# Patient Record
Sex: MALE | Race: WHITE | Employment: OTHER | URBAN - METROPOLITAN AREA
[De-identification: names, ages, dates, MRNs, and addresses within clinical notes are randomized per-mention and may not be internally consistent; named-entity substitution may affect disease eponyms.]

---

## 2017-11-07 LAB — EJECTION FRACTION: 75 %

## 2018-01-05 LAB — EJECTION FRACTION: 68 %

## 2018-04-11 ENCOUNTER — TRANSFERRED RECORDS (OUTPATIENT)
Dept: HEALTH INFORMATION MANAGEMENT | Facility: CLINIC | Age: 73
End: 2018-04-11

## 2018-09-12 ENCOUNTER — TRANSFERRED RECORDS (OUTPATIENT)
Dept: HEALTH INFORMATION MANAGEMENT | Facility: CLINIC | Age: 73
End: 2018-09-12

## 2018-10-11 ENCOUNTER — HOSPITAL ENCOUNTER (OUTPATIENT)
Dept: MRI IMAGING | Facility: CLINIC | Age: 73
Discharge: HOME | End: 2018-10-11
Admitting: SURGERY
Payer: COMMERCIAL

## 2018-10-11 DIAGNOSIS — M79.632 PAIN IN LEFT FOREARM: ICD-10-CM

## 2018-10-11 PROCEDURE — 73218 MRI UPPER EXTREMITY W/O DYE: CPT | Mod: LT

## 2019-02-19 ENCOUNTER — TRANSFERRED RECORDS (OUTPATIENT)
Dept: HEALTH INFORMATION MANAGEMENT | Facility: CLINIC | Age: 74
End: 2019-02-19

## 2019-06-21 ENCOUNTER — APPOINTMENT (OUTPATIENT)
Dept: MRI IMAGING | Facility: CLINIC | Age: 74
End: 2019-06-21
Attending: EMERGENCY MEDICINE
Payer: COMMERCIAL

## 2019-06-21 ENCOUNTER — HOSPITAL ENCOUNTER (EMERGENCY)
Facility: CLINIC | Age: 74
Discharge: HOME | End: 2019-06-21
Attending: EMERGENCY MEDICINE | Admitting: EMERGENCY MEDICINE
Payer: COMMERCIAL

## 2019-06-21 VITALS
HEART RATE: 69 BPM | RESPIRATION RATE: 20 BRPM | WEIGHT: 296.4 LBS | DIASTOLIC BLOOD PRESSURE: 88 MMHG | OXYGEN SATURATION: 96 % | TEMPERATURE: 97.8 F | SYSTOLIC BLOOD PRESSURE: 141 MMHG

## 2019-06-21 DIAGNOSIS — M54.41 ACUTE MIDLINE LOW BACK PAIN WITH RIGHT-SIDED SCIATICA: ICD-10-CM

## 2019-06-21 PROCEDURE — 99284 EMERGENCY DEPT VISIT MOD MDM: CPT | Mod: 25 | Performed by: EMERGENCY MEDICINE

## 2019-06-21 PROCEDURE — 72148 MRI LUMBAR SPINE W/O DYE: CPT

## 2019-06-21 PROCEDURE — 99285 EMERGENCY DEPT VISIT HI MDM: CPT | Mod: Z6 | Performed by: EMERGENCY MEDICINE

## 2019-06-21 RX ORDER — HYDROCODONE BITARTRATE AND ACETAMINOPHEN 5; 325 MG/1; MG/1
1 TABLET ORAL EVERY 6 HOURS PRN
Qty: 18 TABLET | Refills: 0 | Status: SHIPPED | OUTPATIENT
Start: 2019-06-21 | End: 2019-06-24

## 2019-06-21 RX ORDER — METHYLPREDNISOLONE 4 MG
TABLET, DOSE PACK ORAL
Qty: 21 TABLET | Refills: 0 | Status: SHIPPED | OUTPATIENT
Start: 2019-06-21

## 2019-06-21 NOTE — ED TRIAGE NOTES
Pt comes in with complaints of lumbar back pain. [facility] and told to come back for an MRI if it gets worse. Pt states he has bilateral leg numbness R>L.

## 2019-06-21 NOTE — ED PROVIDER NOTES
"  History     Chief Complaint   Patient presents with     Back Pain     HPI     Bebeto Hoffman is a 73 year old male who presents to the ED complaining of low back pain that started a week ago. Patient feels that he twisted it while working on his zero turn , symptoms were bearable but then he did a lot of driving and sitting which is making the pain constant. He describes the pain as located in the \"small of his back, in the middle\" and it is sharp and worsens with movement, he also states that it hurts to go down stairs due to the pain and weakness. Patient stated he is also having numbness and tingling down his right leg.  He does have a history of RA which he is followed by Rheumatology for treatment. Denies dysuria,constipation or fever.    [facility] stated [facility] ( 2 days ago)  for this pain and xrays were obtained, the [facility] recommended an [facility] be completed but he does not have that scheduled yet.     Patient is on Humira for his rheumatoid arthritis.  He has been on prednisone a number of times in the past.  He does not remember having any falls.  No history of low back issues, injections, surgery.  No loss of bowel or bladder function.  No saddle anesthesia.  He is able to ambulate.  No other complaints.      Allergies:  No Known Allergies    Problem List:    There are no active problems to display for this patient.       Past Medical History:    History reviewed. No pertinent past medical history.    Past Surgical History:    History reviewed. No pertinent surgical history.    Family History:    History reviewed. No pertinent family history.    Social History:  Marital Status:   [2]  Social History     Tobacco Use     Smoking status: Former Smoker     Smokeless tobacco: Never Used   Substance Use Topics     Alcohol use: Not Currently     Drug use: Not Currently        Medications:     Humira    Review of Systems     All other ROS reviewed and are " negative or non-contributory except as stated in HPI.      Physical Exam   BP: 156/79  Heart Rate: 82  Temp: 98.6  F (37  C)  Resp: 16  Weight: 134.4 kg (296 lb 6.4 oz)  SpO2: 96 %      Physical Exam   Constitutional: He appears well-developed and well-nourished.   Pleasant, moderately obese male lying in the bed.   HENT:   Head: Normocephalic.   Nose: Nose normal.   Mouth/Throat: Oropharynx is clear and moist.   Eyes: Conjunctivae and EOM are normal.   Neck: Normal range of motion. Neck supple.   Cardiovascular: Normal rate, regular rhythm, normal heart sounds and intact distal pulses.   Pulmonary/Chest: Effort normal and breath sounds normal.   Abdominal: Soft. There is no tenderness.   Musculoskeletal:        Back:    Some slight bony changes of bilateral hands associated with rheumatoid arthritis.  Bilateral knee scars from TKA   Neurological: He is alert. He exhibits normal muscle tone. Coordination normal.   Skin: Skin is warm and dry. He is not diaphoretic.   Psychiatric: He has a normal mood and affect. His behavior is normal.   Vitals reviewed.      ED Course (with Medical Decision Making)    Pt seen and examined by me.  RN and EPIC notes reviewed.      Patient with low back pain, nontraumatic.  History of rheumatoid arthritis.  [facility] in [facility] did not show acute fracture or other bony abnormality.  I did discuss options with the patient including medicating him and have [facility].  I also spoke with MRI who does have an opening tonight.  We are going to try to get this done tonight so he can have closer follow-up.  He drove himself here, so he was not given any medications in the ED.  He declines Toradol.    My plan will be to send him home with a steroid taper, some oral pain medications.  Follow-up with primary care provider/neurosurgery depending on MRI results.    Patient was signed out to Dr. Harper at 7 PM pending MRI.        Procedures    No  results found for this or any previous visit (from the past 24 hour(s)).    Medications - No data to display    Assessments & Plan        Medication List      Started    HYDROcodone-acetaminophen 5-325 MG tablet  Commonly known as:  NORCO  1 tablet, Oral, EVERY 6 HOURS PRN     methylPREDNISolone 4 MG tablet therapy pack  Commonly known as:  MEDROL DOSEPAK  Follow package instructions            Final diagnoses:   Acute midline low back pain with right-sided sciatica         This document serves as a record of services personally performed by  Cecelia Eason MD.  It was created on their behalf by Tiara Beal, a trained medical scribe. The creation of this record is based on the provider's personal observations and the statements of the patient. This document has been checked and approved by the attending provider.    Disclaimer : This note consists of symbols derived from keyboarding, dictation and/or voice recognition software. As a result, there may be errors in the script that have gone undetected. Please consider this when interpreting information found in this chart.      6/21/2019   [facility] EMERGENCY DEPARTMENT     Cecelia Eason MD  06/21/19 1943

## 2019-06-21 NOTE — ED AVS SNAPSHOT
[facility] Emergency Department  [address] [place] [address]  Phone:  [phone]  Fax:  [phone]                                    Bebeto Hoffman   MRN: [id number]    Department:  [facility] Emergency Department   Date of Visit:  6/21/2019           After Visit Summary Signature Page    I have received my discharge instructions, and my questions have been answered. I have discussed any challenges I see with this plan with the nurse or doctor.    ..........................................................................................................................................  Patient/Patient Representative Signature      ..........................................................................................................................................  Patient Representative Print Name and Relationship to Patient    ..................................................               ................................................  Date                                   Time    ..........................................................................................................................................  Reviewed by Signature/Title    ...................................................              ..............................................  Date                                               Time          22EPIC Rev 08/18

## 2019-06-22 NOTE — DISCHARGE INSTRUCTIONS
Rest, ice or heat to the painful area.    Okay to use ibuprofen or Aleve for pain and inflammation.    Vicodin for severe pain.  This may cause constipation and drowsiness.  No driving while on this medication.  Take stool softeners if needed.    Medrol Dosepak as prescribed.  This is a steroid to decrease inflammation.    Follow-up with primary care provider for possible referral for physical therapy or spine specialist.    Return for significant worsening, changes or concerns.    I hope that this starts to improve very quickly!!

## 2019-06-22 NOTE — ED PROVIDER NOTES
Patient was signed out to me at change of shift by Dr. Eason.  I was asked to follow-up on his MRI results.    Results for orders placed or performed during the hospital encounter of 06/21/19 (from the past 24 hour(s))   Lumbar spine MRI w/o contrast    Narrative    MRI LUMBAR SPINE WITHOUT CONTRAST   6/21/2019 7:31 PM     HISTORY: Back pain, less than 6 weeks, no red flags, no prior  management; acute low back pain; history of rheumatoid arthritis on  Humira and steroids in the past.    TECHNIQUE: Multiplanar multisequence MRI of the lumbar spine without  contrast.    COMPARISON: None.     FINDINGS:  Alignment is significant for grade 1 anterolisthesis of L4  on L5. Multilevel mild disc height loss is present. Moderate facet  arthropathy is present at L1-2, L2-3, and L3-4 and severe facet  arthropathy at L4-5 and L5-S1. The conus medullaris demonstrates  slight clumping and irregularity suggestive of sequela of prior  arachnoiditis. Paraspinal soft tissues are unremarkable.    Segmental Analysis:     T12-L1:  No significant spinal canal or foraminal stenosis.     L1-L2:  Broad-based disc bulge. Mild spinal canal stenosis. Moderate  right foraminal stenosis.    L2-L3:  Broad-based disc bulge. Moderate bilateral facet arthropathy.  Mild spinal canal stenosis. No significant foraminal stenosis.      L3-L4:  Broad-based disc bulge and moderate bilateral facet  arthropathy. Moderate-to-severe spinal canal stenosis. Mild bilateral  foraminal stenosis.      L4-L5:  Broad-based disc bulge. Severe bilateral facet arthropathy.  Severe spinal canal stenosis. Moderate left foraminal stenosis. Mild  right foraminal stenosis.      L5-S1:  Broad-based disc bulge. Severe bilateral facet arthropathy.  Moderate-to-severe left foraminal stenosis. Moderate right foraminal  stenosis.      Impression    IMPRESSION:    1. Moderate-to-severe degenerative change as detailed, worst at L4-5.  2. Slight clumping and irregularity of the  cauda equina suggestive of  sequela of arachnoiditis.    GERONIMO STEWART MD        8:06 PM:  Call placed to Dr Grace from Neurosurgery to discuss MRI results/recommendations.      8:10 PM:  Dr Grace called back and we reviewed his case and MRI results.  He agrees with Dr. Eason's plan for pain medication and a Medrol Dosepak.  He does not feel there is anything that needs to be done acutely tonight.  He can follow-up with his primary physician or spine specialist next week if he has persistent problems.  I reviewed the MRI results with him along with Dr. Richard's recommendations.  I sent his written MRI report with him and we placed his MRI on a CD so [facility].  He is confident that he can make it at home.         David Mendoza MD  06/21/19 2025

## 2019-09-17 ENCOUNTER — TRANSFERRED RECORDS (OUTPATIENT)
Dept: HEALTH INFORMATION MANAGEMENT | Facility: CLINIC | Age: 74
End: 2019-09-17

## 2019-10-17 ENCOUNTER — TRANSFERRED RECORDS (OUTPATIENT)
Dept: HEALTH INFORMATION MANAGEMENT | Facility: CLINIC | Age: 74
End: 2019-10-17

## 2019-10-21 LAB — EJECTION FRACTION: NORMAL %

## 2019-12-02 ENCOUNTER — HOSPITAL ENCOUNTER (OUTPATIENT)
Dept: CARDIOLOGY | Facility: CLINIC | Age: 74
Discharge: HOME | End: 2019-12-02
Attending: INTERNAL MEDICINE | Admitting: INTERNAL MEDICINE
Payer: COMMERCIAL

## 2019-12-02 ENCOUNTER — OFFICE VISIT (OUTPATIENT)
Dept: CARDIOLOGY | Facility: CLINIC | Age: 74
End: 2019-12-02
Payer: COMMERCIAL

## 2019-12-02 VITALS
HEIGHT: 72 IN | OXYGEN SATURATION: 96 % | HEART RATE: 72 BPM | BODY MASS INDEX: 42.31 KG/M2 | WEIGHT: 312.4 LBS | RESPIRATION RATE: 12 BRPM | DIASTOLIC BLOOD PRESSURE: 64 MMHG | SYSTOLIC BLOOD PRESSURE: 116 MMHG

## 2019-12-02 DIAGNOSIS — I49.9 CARDIAC ARRHYTHMIA, UNSPECIFIED CARDIAC ARRHYTHMIA TYPE: ICD-10-CM

## 2019-12-02 DIAGNOSIS — I74.9 EMBOLISM AND THROMBOSIS OF ARTERY (H): Primary | ICD-10-CM

## 2019-12-02 DIAGNOSIS — I49.9 CARDIAC ARRHYTHMIA, UNSPECIFIED CARDIAC ARRHYTHMIA TYPE: Primary | ICD-10-CM

## 2019-12-02 PROCEDURE — 93005 ELECTROCARDIOGRAM TRACING: CPT | Performed by: REHABILITATION PRACTITIONER

## 2019-12-02 PROCEDURE — 99203 OFFICE O/P NEW LOW 30 MIN: CPT | Mod: 25 | Performed by: INTERNAL MEDICINE

## 2019-12-02 PROCEDURE — 93000 ELECTROCARDIOGRAM COMPLETE: CPT | Performed by: INTERNAL MEDICINE

## 2019-12-02 RX ORDER — ALBUTEROL SULFATE 90 UG/1
2 AEROSOL, METERED RESPIRATORY (INHALATION) EVERY 6 HOURS
COMMUNITY

## 2019-12-02 RX ORDER — FEBUXOSTAT 40 MG/1
40 TABLET, FILM COATED ORAL DAILY
COMMUNITY

## 2019-12-02 RX ORDER — FERROUS SULFATE 324(65)MG
TABLET, DELAYED RELEASE (ENTERIC COATED) ORAL
COMMUNITY

## 2019-12-02 RX ORDER — FUROSEMIDE 20 MG
20 TABLET ORAL DAILY
COMMUNITY

## 2019-12-02 RX ORDER — TAMSULOSIN HYDROCHLORIDE 0.4 MG/1
0.4 CAPSULE ORAL DAILY
COMMUNITY

## 2019-12-02 RX ORDER — WARFARIN SODIUM 5 MG/1
5 TABLET ORAL DAILY
COMMUNITY

## 2019-12-02 RX ORDER — HYDROCHLOROTHIAZIDE 25 MG/1
25 TABLET ORAL DAILY
COMMUNITY

## 2019-12-02 RX ORDER — HYDRALAZINE HYDROCHLORIDE 50 MG/1
50 TABLET, FILM COATED ORAL 2 TIMES DAILY
COMMUNITY

## 2019-12-02 RX ORDER — CETIRIZINE HYDROCHLORIDE 10 MG/1
10 TABLET ORAL DAILY
COMMUNITY

## 2019-12-02 RX ORDER — ISOSORBIDE MONONITRATE 60 MG/1
60 TABLET, EXTENDED RELEASE ORAL DAILY
COMMUNITY

## 2019-12-02 RX ORDER — PREDNISOLONE 5 MG/1
5 TABLET ORAL DAILY
COMMUNITY

## 2019-12-02 RX ORDER — BUDESONIDE AND FORMOTEROL FUMARATE DIHYDRATE 160; 4.5 UG/1; UG/1
2 AEROSOL RESPIRATORY (INHALATION) 2 TIMES DAILY
COMMUNITY

## 2019-12-02 RX ORDER — ACETAMINOPHEN 500 MG
500-1000 TABLET ORAL 2 TIMES DAILY
COMMUNITY

## 2019-12-02 RX ORDER — LOSARTAN POTASSIUM 100 MG/1
100 TABLET ORAL DAILY
COMMUNITY

## 2019-12-02 RX ORDER — BUPROPION HYDROCHLORIDE 150 MG/1
150 TABLET, EXTENDED RELEASE ORAL DAILY
COMMUNITY

## 2019-12-02 RX ORDER — CHLORAL HYDRATE 500 MG
2 CAPSULE ORAL DAILY
COMMUNITY

## 2019-12-02 RX ORDER — METOPROLOL TARTRATE 100 MG
100 TABLET ORAL DAILY
COMMUNITY

## 2019-12-02 RX ORDER — AMLODIPINE BESYLATE AND ATORVASTATIN CALCIUM 10; 10 MG/1; MG/1
1 TABLET, FILM COATED ORAL DAILY
COMMUNITY

## 2019-12-02 RX ORDER — POTASSIUM CHLORIDE 1500 MG/1
20 TABLET, EXTENDED RELEASE ORAL DAILY
COMMUNITY

## 2019-12-02 RX ORDER — LEVOTHYROXINE SODIUM 50 UG/1
50 TABLET ORAL DAILY
COMMUNITY

## 2019-12-02 RX ORDER — LEFLUNOMIDE 20 MG/1
20 TABLET ORAL DAILY
COMMUNITY

## 2019-12-02 ASSESSMENT — MIFFLIN-ST. JEOR: SCORE: 2205.04

## 2019-12-02 ASSESSMENT — PAIN SCALES - GENERAL: PAINLEVEL: NO PAIN (0)

## 2019-12-02 NOTE — PROGRESS NOTES
HISTORY:    Bebeto Hoffman is a 74-year-old gentleman generally cared for at the [facility].  He is accompanied by his wife today.  He was asked to be seen by cardiology for a possible EDWIN.    Unfortunately, Mango presents with essentially no records.  He reports that he developed a thrombosis of his left arm recently and required surgical thrombectomy.  Apparently the surgeon felt that this was likely from the heart.  An echocardiogram was done during the index hospitalization but that is not available for me to review and needed the patient nor his wife are aware of the results.    When I asked Mango what kind of medical problems he has had he stated that the only thing he has had in the past is arthritis.  Review of his medications and further questions shows that he also has COPD, hypertension, hyper lipidemia, previous stenting, and he believes he may have had previous MI's.  He is not sure about the latter.  He also apparently has obstructive sleep apnea.  Unfortunately, no records were sent along with him today.    I did a thorough cardiovascular review of systems with Mango today.  He denies any history of CVA or strokelike symptoms.  He has not had problems with PND/orthopnea, syncope or near syncope, any sense of palpitations, any exertional chest, arm, neck, or jaw discomfort, peripheral edema, or claudication.  He has never been told he has had atrial fibrillation.    The patient reports that his initial presenting symptoms were numbness of his left arm associated with coldness and blue discoloration.  He was brought to the hospital and surgery was done shortly thereafter.  He reports that he had some swelling of his legs prior to his arm surgery (left greater than right) but none since.  This generally has not been a major issue for him.  He does recall that he had a ultrasound and he was told he did not have any clots in his legs.      ASSESSMENT/PLAN:    1.  Left brachial artery thrombosis.   It is extremely unlikely that this is of cardiac origin.  There is no documentation of or symptoms of atrial fibrillation.  The ECG done today is essentially normal and shows no evidence of previous infarct, so LV thrombus is extremely unlikely.  Of course he may have a PFO or even an ASD but this should be detectable by echocardiography and it would be unlikely that a thrombus crossing his atrial septum would lodge just in his brachial artery and nowhere else.  The EDWIN was requested but I need to have further records before I can approve this because I am not sure it is an appropriate study.  [facility] and review them and decide whether EDWIN is appropriate and let the patient know.  I am much more suspicious that he had a primary arterial thrombosis and for that reason he should have a hematologic work-up for clotting disorders.  Appropriately, he is on warfarin.  2.  Hypertension.  Well-controlled on current med occasions, continue same.    Addendum dated 12/4/2019:  [facility] are reviewed.  His medical problems are listed as rheumatoid arthritis, coronary artery disease, CHF, hyperlipidemia, hypertension, hypothyroidism, sleep apnea, depression, PTSD.  During his hospitalization he was found to occlusion of his left brachial artery requiring operative intervention.  He was started on Lovenox followed by Coumadin.  Transthoracic echo was negative for PFO or thrombus but hematology requested a EDWIN.  In the [facility] the [facility] that I reviewed the actual report was not included but there were references to the echo.  No significant abnormalities apparently were recorded.  I think a EDWIN would be reasonable although the expected yield is extremely low.  I will put in an order for EDWIN.  Originally, this is not done in [place] and will need to be done in the [facility].  FLY Solomon MD, FACC      No orders of the defined types were placed  in this encounter.    Orders Placed This Encounter   Medications     acetaminophen (TYLENOL) 500 MG tablet     Sig: Take 500-1,000 mg by mouth 2 times daily     adalimumab (HUMIRA) 40 MG/0.8ML prefilled syringe kit     Sig: Inject 40 mg Subcutaneous every 14 days     albuterol (PROAIR HFA/PROVENTIL HFA/VENTOLIN HFA) 108 (90 Base) MCG/ACT inhaler     Sig: Inhale 2 puffs into the lungs every 6 hours     Pharmacy may dispense brand covered by insurance (Proair, or proventil or ventolin or generic albuterol inhaler)     amLODIPine-atorvastatin (CADUET) 10-10 MG tablet     Sig: Take 1 tablet by mouth daily     budesonide-formoterol (SYMBICORT) 160-4.5 MCG/ACT Inhaler     Sig: Inhale 2 puffs into the lungs 2 times daily     buPROPion (WELLBUTRIN SR) 150 MG 12 hr tablet     Sig: Take 150 mg by mouth daily     cetirizine (ZYRTEC) 10 MG tablet     Sig: Take 10 mg by mouth daily     cholecalciferol 25 MCG (1000 UT) TABS     febuxostat (ULORIC) 40 MG TABS tablet     Sig: Take 40 mg by mouth daily     Ferrous Sulfate 324 (65 Fe) MG TBEC     fish oil-omega-3 fatty acids 1000 MG capsule     Sig: Take 2 g by mouth daily     furosemide (LASIX) 20 MG tablet     Sig: Take 20 mg by mouth daily     hydrALAZINE (APRESOLINE) 50 MG tablet     Sig: Take 50 mg by mouth 2 times daily     hydrochlorothiazide (HYDRODIURIL) 25 MG tablet     Sig: Take 25 mg by mouth daily     isosorbide mononitrate (IMDUR) 60 MG 24 hr tablet     Sig: Take 60 mg by mouth daily     leflunomide (ARAVA) 20 MG tablet     Sig: Take 20 mg by mouth daily     levothyroxine (SYNTHROID/LEVOTHROID) 50 MCG tablet     Sig: Take 50 mcg by mouth daily     losartan (COZAAR) 100 MG tablet     Sig: Take 100 mg by mouth daily     metoprolol tartrate (LOPRESSOR) 100 MG tablet     Sig: Take 100 mg by mouth daily     omeprazole (PRILOSEC) 20 MG DR capsule     Sig: Take 20 mg by mouth daily     potassium chloride ER (K-DUR/KLOR-CON M) 20 MEQ CR tablet     Sig: Take 20 mEq by mouth  daily     prednisoLONE 5 MG tablet     Sig: Take 5 mg by mouth daily     tamsulosin (FLOMAX) 0.4 MG capsule     Sig: Take 0.4 mg by mouth daily     warfarin ANTICOAGULANT (COUMADIN) 5 MG tablet     Sig: Take 5 mg by mouth daily 7.5 Sunday,TUES,THURSDAY.   5 MG OTHER DAYS     There are no discontinued medications.    10 year ASCVD risk: The ASCVD Risk score (Judy GIMENEZ Jr., et al., 2013) failed to calculate for the following reasons:    Cannot find a previous HDL lab    Cannot find a previous total cholesterol lab    Encounter Diagnosis   Name Primary?     Cardiac arrhythmia, unspecified cardiac arrhythmia type        CURRENT MEDICATIONS:  Current Outpatient Medications   Medication Sig Dispense Refill     acetaminophen (TYLENOL) 500 MG tablet Take 500-1,000 mg by mouth 2 times daily       adalimumab (HUMIRA) 40 MG/0.8ML prefilled syringe kit Inject 40 mg Subcutaneous every 14 days       albuterol (PROAIR HFA/PROVENTIL HFA/VENTOLIN HFA) 108 (90 Base) MCG/ACT inhaler Inhale 2 puffs into the lungs every 6 hours       amLODIPine-atorvastatin (CADUET) 10-10 MG tablet Take 1 tablet by mouth daily       budesonide-formoterol (SYMBICORT) 160-4.5 MCG/ACT Inhaler Inhale 2 puffs into the lungs 2 times daily       buPROPion (WELLBUTRIN SR) 150 MG 12 hr tablet Take 150 mg by mouth daily       cetirizine (ZYRTEC) 10 MG tablet Take 10 mg by mouth daily       cholecalciferol 25 MCG (1000 UT) TABS        febuxostat (ULORIC) 40 MG TABS tablet Take 40 mg by mouth daily       Ferrous Sulfate 324 (65 Fe) MG TBEC        fish oil-omega-3 fatty acids 1000 MG capsule Take 2 g by mouth daily       furosemide (LASIX) 20 MG tablet Take 20 mg by mouth daily       hydrALAZINE (APRESOLINE) 50 MG tablet Take 50 mg by mouth 2 times daily       hydrochlorothiazide (HYDRODIURIL) 25 MG tablet Take 25 mg by mouth daily       isosorbide mononitrate (IMDUR) 60 MG 24 hr tablet Take 60 mg by mouth daily       leflunomide (ARAVA) 20 MG tablet Take 20 mg by  mouth daily       levothyroxine (SYNTHROID/LEVOTHROID) 50 MCG tablet Take 50 mcg by mouth daily       losartan (COZAAR) 100 MG tablet Take 100 mg by mouth daily       methylPREDNISolone (MEDROL DOSEPAK) 4 MG tablet therapy pack Follow package instructions 21 tablet 0     metoprolol tartrate (LOPRESSOR) 100 MG tablet Take 100 mg by mouth daily       omeprazole (PRILOSEC) 20 MG DR capsule Take 20 mg by mouth daily       potassium chloride ER (K-DUR/KLOR-CON M) 20 MEQ CR tablet Take 20 mEq by mouth daily       prednisoLONE 5 MG tablet Take 5 mg by mouth daily       tamsulosin (FLOMAX) 0.4 MG capsule Take 0.4 mg by mouth daily       warfarin ANTICOAGULANT (COUMADIN) 5 MG tablet Take 5 mg by mouth daily 7.5 Sunday,TUES,THURSDAY.   5 MG OTHER DAYS         ALLERGIES   No Known Allergies    PAST MEDICAL HISTORY:  No past medical history on file.    PAST SURGICAL HISTORY:  No past surgical history on file.    FAMILY HISTORY:  No family history on file.    SOCIAL HISTORY:  Social History     Socioeconomic History     Marital status:      Spouse name: Not on file     Number of children: Not on file     Years of education: Not on file     Highest education level: Not on file   Occupational History     Not on file   Social Needs     Financial resource strain: Not on file     Food insecurity:     Worry: Not on file     Inability: Not on file     Transportation needs:     Medical: Not on file     Non-medical: Not on file   Tobacco Use     Smoking status: Former Smoker     Smokeless tobacco: Never Used   Substance and Sexual Activity     Alcohol use: Not Currently     Drug use: Not Currently     Sexual activity: Not on file   Lifestyle     Physical activity:     Days per week: Not on file     Minutes per session: Not on file     Stress: Not on file   Relationships     Social connections:     Talks on phone: Not on file     Gets together: Not on file     Attends Christianity service: Not on file     Active member of club or  organization: Not on file     Attends meetings of clubs or organizations: Not on file     Relationship status: Not on file     Intimate partner violence:     Fear of current or ex partner: Not on file     Emotionally abused: Not on file     Physically abused: Not on file     Forced sexual activity: Not on file   Other Topics Concern     Not on file   Social History Narrative     Not on file       Review of Systems:  Skin:  Negative     Eyes:  Positive for glasses  ENT:  Negative    Respiratory:  Positive for shortness of breath;dyspnea on exertion;cough;wheezing  Cardiovascular:  Negative;dizziness;lightheadedness;chest pain Positive for;edema;exercise intolerance;fatigue  Gastroenterology: Positive for heartburn  Genitourinary:  Negative    Musculoskeletal:  Positive for arthritis  Neurologic:  Negative    Psychiatric:  Positive for sleep disturbances  Heme/Lymph/Imm:  Negative    Endocrine:  Negative      Physical Exam:  Vitals: /64 (BP Location: Right arm, Cuff Size: Adult Large)   Pulse 72   Resp 12   Ht 1.829 m (6')   Wt 141.7 kg (312 lb 6.4 oz)   SpO2 96%   BMI 42.37 kg/m      Constitutional:  cooperative, alert and oriented, well developed, well nourished, in no acute distress obese      Skin:  warm and dry to the touch        Head:  normocephalic        Eyes:  no xanthalasma        ENT:  no pallor or cyanosis        Neck:  carotid pulses are full and equal bilaterally, JVP normal, no carotid bruit        Chest:  normal breath sounds, clear to auscultation, normal A-P diameter, normal symmetry, normal respiratory excursion, no use of accessory muscles        Cardiac: regular rhythm, normal S1/S2, no S3 or S4, apical impulse not displaced, no murmurs, gallops or rubs                  Abdomen:  abdomen soft;BS normoactive        Vascular: pulses full and equal                                      Extremities and Back:  no edema        Neurological:  no gross motor deficits          Recent Lab  Results:  LIPID RESULTS:  No results found for: CHOL, HDL, LDL, TRIG, CHOLHDLRATIO    LIVER ENZYME RESULTS:  No results found for: AST, ALT    CBC RESULTS:  No results found for: WBC, RBC, HGB, HCT, MCV, MCH, MCHC, RDW, PLT    BMP RESULTS:  No results found for: NA, POTASSIUM, CHLORIDE, CO2, ANIONGAP, GLC, BUN, CR, GFRESTIMATED, GFRESTBLACK, DHARA     A1C RESULTS:  No results found for: A1C    INR RESULTS:  No results found for: INR      Yannick Solomon MD, [facility]    CC  No referring provider defined for this encounter.

## 2019-12-02 NOTE — LETTER
12/2/2019    [facility]  [address] [place] [address]    RE: Bebeto Hoffman       Dear Colleague,    I had the pleasure of seeing Bebeto Hoffman in the [facility].    HISTORY:    Bebeto Hoffman is a 74-year-old gentleman [facility].  He is accompanied by his wife today.  He was asked to be seen by cardiology for a possible EDWIN.    Unfortunately, Mango presents with essentially no records.  He reports that he developed a thrombosis of his left arm recently and required surgical thrombectomy.  Apparently the surgeon felt that this was likely from the heart.  An echocardiogram was done during the index hospitalization but that is not available for me to review and needed the patient nor his wife are aware of the results.    When I asked Mango what kind of medical problems he has had he stated that the only thing he has had in the past is arthritis.  Review of his medications and further questions shows that he also has COPD, hypertension, hyper lipidemia, previous stenting, and he believes he may have had previous MI's.  He is not sure about the latter.  He also apparently has obstructive sleep apnea.  Unfortunately, no records were sent along with him today.    I did a thorough cardiovascular review of systems with Mango today.  He denies any history of CVA or strokelike symptoms.  He has not had problems with PND/orthopnea, syncope or near syncope, any sense of palpitations, any exertional chest, arm, neck, or jaw discomfort, peripheral edema, or claudication.  He has never been told he has had atrial fibrillation.    The patient reports that his initial presenting symptoms were numbness of his left arm associated with coldness and blue discoloration.  He was brought to the hospital and surgery was done shortly thereafter.  He reports that he had some swelling of his legs prior to his arm surgery (left  greater than right) but none since.  This generally has not been a major issue for him.  He does recall that he had a ultrasound and he was told he did not have any clots in his legs.      ASSESSMENT/PLAN:    1.  Left brachial artery thrombosis.  It is extremely unlikely that this is of cardiac origin.  There is no documentation of or symptoms of atrial fibrillation.  The ECG done today is essentially normal and shows no evidence of previous infarct, so LV thrombus is extremely unlikely.  Of course he may have a PFO or even an ASD but this should be detectable by echocardiography and it would be unlikely that a thrombus crossing his atrial septum would lodge just in his brachial artery and nowhere else.  The EDWIN was requested but I need to have further records before I can approve this because I am not sure it is an appropriate study.  [facility] will [facility] us [facility] and review them and decide whether EDWIN is appropriate and let the patient know.  I am much more suspicious that he had a primary arterial thrombosis and for that reason he should have a hematologic work-up for clotting disorders.  Appropriately, he is on warfarin.  2.  Hypertension.  Well-controlled on current med occasions, continue same.    Addendum dated 12/4/2019:  [facility]  His medical problems are listed as rheumatoid arthritis, coronary artery disease, CHF, hyperlipidemia, hypertension, hypothyroidism, sleep apnea, depression, PTSD.  During his hospitalization he was found to occlusion of his left brachial artery requiring operative intervention.  He was started on Lovenox followed by Coumadin.  Transthoracic echo was negative for PFO or thrombus but hematology requested a EDWIN.  In [facility] that I reviewed the actual report was not included but there were references to the echo.  No significant abnormalities apparently were recorded.  I think a EDWIN would be  reasonable although the expected yield is extremely low.  I will put in an order for EDWIN.  Originally, this is not done in [place] and will need to be done in the [facility].  FLY Solmoon MD, [facility]      No orders of the defined types were placed in this encounter.    Orders Placed This Encounter   Medications     acetaminophen (TYLENOL) 500 MG tablet     Sig: Take 500-1,000 mg by mouth 2 times daily     adalimumab (HUMIRA) 40 MG/0.8ML prefilled syringe kit     Sig: Inject 40 mg Subcutaneous every 14 days     albuterol (PROAIR HFA/PROVENTIL HFA/VENTOLIN HFA) 108 (90 Base) MCG/ACT inhaler     Sig: Inhale 2 puffs into the lungs every 6 hours     Pharmacy may dispense brand covered by insurance (Proair, or proventil or ventolin or generic albuterol inhaler)     amLODIPine-atorvastatin (CADUET) 10-10 MG tablet     Sig: Take 1 tablet by mouth daily     budesonide-formoterol (SYMBICORT) 160-4.5 MCG/ACT Inhaler     Sig: Inhale 2 puffs into the lungs 2 times daily     buPROPion (WELLBUTRIN SR) 150 MG 12 hr tablet     Sig: Take 150 mg by mouth daily     cetirizine (ZYRTEC) 10 MG tablet     Sig: Take 10 mg by mouth daily     cholecalciferol 25 MCG (1000 UT) TABS     febuxostat (ULORIC) 40 MG TABS tablet     Sig: Take 40 mg by mouth daily     Ferrous Sulfate 324 (65 Fe) MG TBEC     fish oil-omega-3 fatty acids 1000 MG capsule     Sig: Take 2 g by mouth daily     furosemide (LASIX) 20 MG tablet     Sig: Take 20 mg by mouth daily     hydrALAZINE (APRESOLINE) 50 MG tablet     Sig: Take 50 mg by mouth 2 times daily     hydrochlorothiazide (HYDRODIURIL) 25 MG tablet     Sig: Take 25 mg by mouth daily     isosorbide mononitrate (IMDUR) 60 MG 24 hr tablet     Sig: Take 60 mg by mouth daily     leflunomide (ARAVA) 20 MG tablet     Sig: Take 20 mg by mouth daily     levothyroxine (SYNTHROID/LEVOTHROID) 50 MCG tablet     Sig: Take 50 mcg by mouth daily     losartan (COZAAR) 100 MG tablet     Sig: Take 100 mg by mouth daily     metoprolol  tartrate (LOPRESSOR) 100 MG tablet     Sig: Take 100 mg by mouth daily     omeprazole (PRILOSEC) 20 MG DR capsule     Sig: Take 20 mg by mouth daily     potassium chloride ER (K-DUR/KLOR-CON M) 20 MEQ CR tablet     Sig: Take 20 mEq by mouth daily     prednisoLONE 5 MG tablet     Sig: Take 5 mg by mouth daily     tamsulosin (FLOMAX) 0.4 MG capsule     Sig: Take 0.4 mg by mouth daily     warfarin ANTICOAGULANT (COUMADIN) 5 MG tablet     Sig: Take 5 mg by mouth daily 7.5 Sunday,TUES,THURSDAY.   5 MG OTHER DAYS     There are no discontinued medications.    10 year ASCVD risk: The ASCVD Risk score (Orient PERNELL Jr., et al., 2013) failed to calculate for the following reasons:    Cannot find a previous HDL lab    Cannot find a previous total cholesterol lab    Encounter Diagnosis   Name Primary?     Cardiac arrhythmia, unspecified cardiac arrhythmia type        CURRENT MEDICATIONS:  Current Outpatient Medications   Medication Sig Dispense Refill     acetaminophen (TYLENOL) 500 MG tablet Take 500-1,000 mg by mouth 2 times daily       adalimumab (HUMIRA) 40 MG/0.8ML prefilled syringe kit Inject 40 mg Subcutaneous every 14 days       albuterol (PROAIR HFA/PROVENTIL HFA/VENTOLIN HFA) 108 (90 Base) MCG/ACT inhaler Inhale 2 puffs into the lungs every 6 hours       amLODIPine-atorvastatin (CADUET) 10-10 MG tablet Take 1 tablet by mouth daily       budesonide-formoterol (SYMBICORT) 160-4.5 MCG/ACT Inhaler Inhale 2 puffs into the lungs 2 times daily       buPROPion (WELLBUTRIN SR) 150 MG 12 hr tablet Take 150 mg by mouth daily       cetirizine (ZYRTEC) 10 MG tablet Take 10 mg by mouth daily       cholecalciferol 25 MCG (1000 UT) TABS        febuxostat (ULORIC) 40 MG TABS tablet Take 40 mg by mouth daily       Ferrous Sulfate 324 (65 Fe) MG TBEC        fish oil-omega-3 fatty acids 1000 MG capsule Take 2 g by mouth daily       furosemide (LASIX) 20 MG tablet Take 20 mg by mouth daily       hydrALAZINE (APRESOLINE) 50 MG tablet Take 50 mg  by mouth 2 times daily       hydrochlorothiazide (HYDRODIURIL) 25 MG tablet Take 25 mg by mouth daily       isosorbide mononitrate (IMDUR) 60 MG 24 hr tablet Take 60 mg by mouth daily       leflunomide (ARAVA) 20 MG tablet Take 20 mg by mouth daily       levothyroxine (SYNTHROID/LEVOTHROID) 50 MCG tablet Take 50 mcg by mouth daily       losartan (COZAAR) 100 MG tablet Take 100 mg by mouth daily       methylPREDNISolone (MEDROL DOSEPAK) 4 MG tablet therapy pack Follow package instructions 21 tablet 0     metoprolol tartrate (LOPRESSOR) 100 MG tablet Take 100 mg by mouth daily       omeprazole (PRILOSEC) 20 MG DR capsule Take 20 mg by mouth daily       potassium chloride ER (K-DUR/KLOR-CON M) 20 MEQ CR tablet Take 20 mEq by mouth daily       prednisoLONE 5 MG tablet Take 5 mg by mouth daily       tamsulosin (FLOMAX) 0.4 MG capsule Take 0.4 mg by mouth daily       warfarin ANTICOAGULANT (COUMADIN) 5 MG tablet Take 5 mg by mouth daily 7.5 Sunday,TUES,THURSDAY.   5 MG OTHER DAYS         ALLERGIES   No Known Allergies    PAST MEDICAL HISTORY:  No past medical history on file.    PAST SURGICAL HISTORY:  No past surgical history on file.    FAMILY HISTORY:  No family history on file.    SOCIAL HISTORY:  Social History     Socioeconomic History     Marital status:      Spouse name: Not on file     Number of children: Not on file     Years of education: Not on file     Highest education level: Not on file   Occupational History     Not on file   Social Needs     Financial resource strain: Not on file     Food insecurity:     Worry: Not on file     Inability: Not on file     Transportation needs:     Medical: Not on file     Non-medical: Not on file   Tobacco Use     Smoking status: Former Smoker     Smokeless tobacco: Never Used   Substance and Sexual Activity     Alcohol use: Not Currently     Drug use: Not Currently     Sexual activity: Not on file   Lifestyle     Physical activity:     Days per week: Not on file      Minutes per session: Not on file     Stress: Not on file   Relationships     Social connections:     Talks on phone: Not on file     Gets together: Not on file     Attends [employer] service: Not on file     Active member of club or organization: Not on file     Attends meetings of clubs or organizations: Not on file     Relationship status: Not on file     Intimate partner violence:     Fear of current or ex partner: Not on file     Emotionally abused: Not on file     Physically abused: Not on file     Forced sexual activity: Not on file   Other Topics Concern     Not on file   Social History Narrative     Not on file       Review of Systems:  Skin:  Negative     Eyes:  Positive for glasses  ENT:  Negative    Respiratory:  Positive for shortness of breath;dyspnea on exertion;cough;wheezing  Cardiovascular:  Negative;dizziness;lightheadedness;chest pain Positive for;edema;exercise intolerance;fatigue  Gastroenterology: Positive for heartburn  Genitourinary:  Negative    Musculoskeletal:  Positive for arthritis  Neurologic:  Negative    Psychiatric:  Positive for sleep disturbances  Heme/Lymph/Imm:  Negative    Endocrine:  Negative      Physical Exam:  Vitals: /64 (BP Location: Right arm, Cuff Size: Adult Large)   Pulse 72   Resp 12   Ht 1.829 m (6')   Wt 141.7 kg (312 lb 6.4 oz)   SpO2 96%   BMI 42.37 kg/m       Constitutional:  cooperative, alert and oriented, well developed, well nourished, in no acute distress obese      Skin:  warm and dry to the touch        Head:  normocephalic        Eyes:  no xanthalasma        ENT:  no pallor or cyanosis        Neck:  carotid pulses are full and equal bilaterally, JVP normal, no carotid bruit        Chest:  normal breath sounds, clear to auscultation, normal A-P diameter, normal symmetry, normal respiratory excursion, no use of accessory muscles        Cardiac: regular rhythm, normal S1/S2, no S3 or S4, apical impulse not displaced, no murmurs, gallops or rubs                   Abdomen:  abdomen soft;BS normoactive        Vascular: pulses full and equal                                      Extremities and Back:  no edema        Neurological:  no gross motor deficits          Recent Lab Results:  LIPID RESULTS:  No results found for: CHOL, HDL, LDL, TRIG, CHOLHDLRATIO    LIVER ENZYME RESULTS:  No results found for: AST, ALT    CBC RESULTS:  No results found for: WBC, RBC, HGB, HCT, MCV, MCH, MCHC, RDW, PLT    BMP RESULTS:  No results found for: NA, POTASSIUM, CHLORIDE, CO2, ANIONGAP, GLC, BUN, CR, GFRESTIMATED, GFRESTBLACK, DHARA     A1C RESULTS:  No results found for: A1C    INR RESULTS:  No results found for: INR      Thank you for allowing me to participate in the care of your patient.    Sincerely,     Yannick Solomon MD     [facility]

## 2019-12-04 ENCOUNTER — TRANSFERRED RECORDS (OUTPATIENT)
Dept: HEALTH INFORMATION MANAGEMENT | Facility: CLINIC | Age: 74
End: 2019-12-04

## 2019-12-16 ENCOUNTER — TELEPHONE (OUTPATIENT)
Dept: CARDIOLOGY | Facility: CLINIC | Age: 74
End: 2019-12-16

## 2019-12-16 NOTE — TELEPHONE ENCOUNTER
EDWIN  Scheduled: 12-18-19 (Wednesday)  Location: [facility]  Check-in time: 1130 am   Procedure time: 130 pm     Prep instructions were reviewed with patient over the phone. Patient had no questions.    See instructions below.    Patient to be NPO x 6 hours, before the procedure.      Patient to avoid antacids on the morning of the procedure.      Patient can take Hydralazine, Imdur, Losartan, and Lopressor on the morning of the procedure. (With small sips of water)      Patient should take his other medications when he returns home.     Patient's wife will be driving him home and will be staying with him for 6 hours after the procedure.       Dominic RN, BSN

## 2019-12-18 ENCOUNTER — HOSPITAL ENCOUNTER (OUTPATIENT)
Facility: CLINIC | Age: 74
Discharge: HOME | End: 2019-12-18
Attending: INTERNAL MEDICINE | Admitting: INTERNAL MEDICINE
Payer: COMMERCIAL

## 2019-12-18 ENCOUNTER — HOSPITAL ENCOUNTER (OUTPATIENT)
Dept: CARDIOLOGY | Facility: CLINIC | Age: 74
End: 2019-12-18
Attending: INTERNAL MEDICINE | Admitting: INTERNAL MEDICINE
Payer: COMMERCIAL

## 2019-12-18 VITALS
BODY MASS INDEX: 42.31 KG/M2 | RESPIRATION RATE: 18 BRPM | DIASTOLIC BLOOD PRESSURE: 95 MMHG | HEIGHT: 72 IN | HEART RATE: 66 BPM | OXYGEN SATURATION: 93 % | TEMPERATURE: 97 F | WEIGHT: 312.4 LBS | SYSTOLIC BLOOD PRESSURE: 155 MMHG

## 2019-12-18 DIAGNOSIS — I49.9 CARDIAC ARRHYTHMIA, UNSPECIFIED CARDIAC ARRHYTHMIA TYPE: ICD-10-CM

## 2019-12-18 DIAGNOSIS — I74.9 EMBOLISM AND THROMBOSIS OF ARTERY (H): ICD-10-CM

## 2019-12-18 PROCEDURE — 25000128 H RX IP 250 OP 636: Performed by: INTERNAL MEDICINE

## 2019-12-18 PROCEDURE — 40000857 ZZH STATISTIC TEE INCLUDES SEDATION

## 2019-12-18 PROCEDURE — 25000125 ZZHC RX 250: Performed by: INTERNAL MEDICINE

## 2019-12-18 PROCEDURE — 99152 MOD SED SAME PHYS/QHP 5/>YRS: CPT | Performed by: INTERNAL MEDICINE

## 2019-12-18 PROCEDURE — 25800030 ZZH RX IP 258 OP 636: Performed by: INTERNAL MEDICINE

## 2019-12-18 PROCEDURE — 40000235 ZZH STATISTIC TELEMETRY

## 2019-12-18 PROCEDURE — 93325 DOPPLER ECHO COLOR FLOW MAPG: CPT

## 2019-12-18 PROCEDURE — 93320 DOPPLER ECHO COMPLETE: CPT | Mod: 26 | Performed by: INTERNAL MEDICINE

## 2019-12-18 PROCEDURE — 93325 DOPPLER ECHO COLOR FLOW MAPG: CPT | Mod: 26 | Performed by: INTERNAL MEDICINE

## 2019-12-18 PROCEDURE — 93312 ECHO TRANSESOPHAGEAL: CPT | Mod: 26 | Performed by: INTERNAL MEDICINE

## 2019-12-18 RX ORDER — LIDOCAINE HYDROCHLORIDE 40 MG/ML
1.5 SOLUTION TOPICAL ONCE
Status: COMPLETED | OUTPATIENT
Start: 2019-12-18 | End: 2019-12-18

## 2019-12-18 RX ORDER — DEXTROSE MONOHYDRATE 25 G/50ML
9.5 INJECTION, SOLUTION INTRAVENOUS
Status: DISCONTINUED | OUTPATIENT
Start: 2019-12-18 | End: 2019-12-18 | Stop reason: HOSPADM

## 2019-12-18 RX ORDER — NALOXONE HYDROCHLORIDE 0.4 MG/ML
.1-.4 INJECTION, SOLUTION INTRAMUSCULAR; INTRAVENOUS; SUBCUTANEOUS
Status: DISCONTINUED | OUTPATIENT
Start: 2019-12-18 | End: 2019-12-18 | Stop reason: HOSPADM

## 2019-12-18 RX ORDER — LIDOCAINE 50 MG/G
OINTMENT TOPICAL ONCE
Status: COMPLETED | OUTPATIENT
Start: 2019-12-18 | End: 2019-12-18

## 2019-12-18 RX ORDER — SODIUM CHLORIDE 9 MG/ML
INJECTION, SOLUTION INTRAVENOUS CONTINUOUS PRN
Status: DISCONTINUED | OUTPATIENT
Start: 2019-12-18 | End: 2019-12-18 | Stop reason: HOSPADM

## 2019-12-18 RX ORDER — FENTANYL CITRATE 50 UG/ML
25 INJECTION, SOLUTION INTRAMUSCULAR; INTRAVENOUS
Status: DISCONTINUED | OUTPATIENT
Start: 2019-12-18 | End: 2019-12-18 | Stop reason: HOSPADM

## 2019-12-18 RX ORDER — FLUMAZENIL 0.1 MG/ML
0.2 INJECTION, SOLUTION INTRAVENOUS
Status: DISCONTINUED | OUTPATIENT
Start: 2019-12-18 | End: 2019-12-18 | Stop reason: HOSPADM

## 2019-12-18 RX ORDER — LIDOCAINE 40 MG/G
CREAM TOPICAL
Status: DISCONTINUED | OUTPATIENT
Start: 2019-12-18 | End: 2019-12-18 | Stop reason: HOSPADM

## 2019-12-18 RX ORDER — GLYCOPYRROLATE 0.2 MG/ML
0.1 INJECTION, SOLUTION INTRAMUSCULAR; INTRAVENOUS ONCE
Status: COMPLETED | OUTPATIENT
Start: 2019-12-18 | End: 2019-12-18

## 2019-12-18 RX ADMIN — TOPICAL ANESTHETIC 0.5 ML: 200 SPRAY DENTAL; PERIODONTAL at 13:57

## 2019-12-18 RX ADMIN — MIDAZOLAM 2 MG: 1 INJECTION INTRAMUSCULAR; INTRAVENOUS at 13:58

## 2019-12-18 RX ADMIN — LIDOCAINE: 50 OINTMENT TOPICAL at 13:38

## 2019-12-18 RX ADMIN — FENTANYL CITRATE 25 MCG: 50 INJECTION, SOLUTION INTRAMUSCULAR; INTRAVENOUS at 14:01

## 2019-12-18 RX ADMIN — SODIUM CHLORIDE: 9 INJECTION, SOLUTION INTRAVENOUS at 13:20

## 2019-12-18 RX ADMIN — GLYCOPYRROLATE 0.1 MG: 0.2 INJECTION, SOLUTION INTRAMUSCULAR; INTRAVENOUS at 13:30

## 2019-12-18 ASSESSMENT — MIFFLIN-ST. JEOR: SCORE: 2205.04

## 2019-12-18 NOTE — PROGRESS NOTES
[facility] Discharge Nursing Note    Education/questions answered: yes  Patient DC location: to wife to car via wheel chair  Accompanied by: me to wife  CS discharge time: 1514

## 2019-12-18 NOTE — PROGRESS NOTES
[facility] Admission Nursing Note    Reason for admission: dana  CS arrival time: 1150  Accompanied by: wife anju, here,  and to be with him after discharge  Name/phone of DC : [phone]  Medications held: see pta med list. Took heart meds am x4. Most of rest were yesterday. Warfarin as per schedule yesterday betsey.  Consent signed: per md  Abnormal assessment/labs: na  If abnormal, provider notified: na  Education/questions answered: review avs discharge instructions and copy given. Lungs clear. Heart tones distant. Pain at 3 at rest of all over arthritis pain. Pt denies sleep apnea, chart says yes. Npo since last night. Denies trouble swallowing. Top denture. Will remove.   Plan: per orders

## 2019-12-18 NOTE — DISCHARGE INSTRUCTIONS
EDWIN  (Transesophageal Echocardiogram)  Discharge Instructions    After you go home:      Have an adult stay with you for 6 hours.       For 24 hours - due to the sedation you received:    Relax and take it easy.    Do NOT make any important or legal decisions.    Do NOT drive or operate machines at home or at work.    Do NOT drink alcohol.    Diet:    You may resume your normal diet, but no scratchy foods for two days.    If your throat is sore, eat cold, bland or soft foods.    You may have heartburn if the tube used in the exam entered your stomach.  If so:   - Do not eat acidic and spicy foods.   - Do not eat three hours before bedtime. Clear liquids are okay.   - When lying down, use two pillows to raise your head.    Medicines:      Take your medications, including blood thinners, unless your provider tells you not to.    If you have stopped any medicines, check with your provider about when to restart them.    You may take Tylenol (Acetaminophen) if your throat is sore.    You may take antacids if you have heartburn.      Follow Up Appointments:      Follow up with your cardiologist at [facility] of patient preference as instructed.    Follow up with your primary care provider as needed.    Call the clinic if:      You have heartburn that is severe or lasts more than 72 hours.    You have a sore throat that feels worse after 72 hours.    You have shortness of breath, neck pain, chest pain, fever, chills, coughing up blood, or other unusual signs.    Questions or concerns      [facility] [place]:    [phone] [facility] (7 days a week)

## 2019-12-18 NOTE — PRE-PROCEDURE
GENERAL PRE-PROCEDURE:   Procedure:  Transesophageal echocardiogram.  Date/Time:  12/18/2019 1:58 PM    Written consent obtained?: Yes    Risks and benefits: Risks, benefits and alternatives were discussed    Consent given by:  Patient  Patient states understanding of procedure being performed: Yes    Patient's understanding of procedure matches consent: Yes    Procedure consent matches procedure scheduled: Yes    Expected level of sedation:  Moderate  Appropriately NPO:  Yes  ASA Class:  Class 2- mild systemic disease, no acute problems, no functional limitations  Mallampati  :  Grade 4- soft palate obscured by base of tongue  Lungs:  Lungs clear with good breath sounds bilaterally  Heart:  Normal heart sounds and rate  History & Physical reviewed:  History and physical reviewed and no updates needed  Statement of review:  I have reviewed the lab findings, diagnostic data, medications, and the plan for sedation

## 2019-12-18 NOTE — PROGRESS NOTES
[facility] Post-Procedure Note    Procedure: dana  CS arrival time: stayed in cs 20  Accompanied by: me  Concerns/abnormal assessment after procedure: VSS. No pain. Awake and visiting with wife.  Plan: post cares. Pt already has discharge instructions given pre procedure. Teeth back in mouth.

## 2019-12-18 NOTE — PROGRESS NOTES
[facility] Procedure Nursing Note    Procedure: dana  Procedure started time: 1358 sedation started. After time out and consented.dr del toro did her assesssment  Procedure completed time: 1416 tube removed  Concerns/abnormal assessment: VSS. Tolerated well. No pain. Given total of 2 mg versed and 50 mcg fentanyl iv for sedation.  Plan: routine post cares

## 2019-12-20 ENCOUNTER — TELEPHONE (OUTPATIENT)
Dept: CARDIOLOGY | Facility: CLINIC | Age: 74
End: 2019-12-20

## 2019-12-20 NOTE — TELEPHONE ENCOUNTER
EDWIN was completed on 12-18-19    1. No intracardiac mass or thrombus.  2. Normal left ventricular size and systolic function. LVEF 60-65%.  3. Normal right ventricular size and systolic function.  4. No significant valve disease.  5. Bubble study was negative for inter-atrial shunt.  6. Mild aortic root dilatation (4.0 cm). Mild atherosclerotic plaques in the  descending aorta.     There is no comparison study available.    Notes recorded by Jemima Nails RN on 12/18/2019 at 4:06 PM CST  Dr. Solomon, see your last OV notes. Pt was found to have an occlusion of his left brachial artery that required operative intervention. Pt was started on Lovenox followed by Coumadin.  Transthoracic echo was negative for PFO or thrombus but hematology requested a EDWIN. EDWIN was not done, so you ordered it.     EDWIN shows no mass or thrombus.   Mild aortic root noted.     No f/u ordered.     Dominic DE LEÓN, BSN       Message from Yannick Solomon MD sent at 12/19/2019  5:11 PM CST    As expected, no significant findings.  The aortic root is normal in size when corrected for age, gender and size (now up to 4.4 cm).  No further evaluation or follow up needed.    --------------------------------------------------------------------------------------------------------------------------    Called patient to review. Left patient a message. Awaiting call back.        ----------------------------------------------------------------------------------------------------------------------------       Addendum    Spoke to Sherlyn, patient's wife (patient gave a verbal consent to speak with his wife). Reviewed EDWIN results. I advised patient to f/u PRN. Per Sherlyn's request. Medical records will forward Dr. Solomon's OV notes and EDWIN results (report/images (CD) to  [facility] [place] (Fax number [phone] / Phone number [phone])    Dominic RN, BSN  [facility]

## (undated) RX ORDER — GLYCOPYRROLATE 0.2 MG/ML
INJECTION, SOLUTION INTRAMUSCULAR; INTRAVENOUS
Status: DISPENSED
Start: 2019-12-18

## (undated) RX ORDER — LIDOCAINE 50 MG/G
OINTMENT TOPICAL
Status: DISPENSED
Start: 2019-12-18

## (undated) RX ORDER — FLUMAZENIL 0.1 MG/ML
INJECTION, SOLUTION INTRAVENOUS
Status: DISPENSED
Start: 2019-12-18

## (undated) RX ORDER — NALOXONE HYDROCHLORIDE 0.4 MG/ML
INJECTION, SOLUTION INTRAMUSCULAR; INTRAVENOUS; SUBCUTANEOUS
Status: DISPENSED
Start: 2019-12-18

## (undated) RX ORDER — FENTANYL CITRATE 50 UG/ML
INJECTION, SOLUTION INTRAMUSCULAR; INTRAVENOUS
Status: DISPENSED
Start: 2019-12-18